# Patient Record
Sex: FEMALE | Race: BLACK OR AFRICAN AMERICAN | NOT HISPANIC OR LATINO | URBAN - METROPOLITAN AREA
[De-identification: names, ages, dates, MRNs, and addresses within clinical notes are randomized per-mention and may not be internally consistent; named-entity substitution may affect disease eponyms.]

---

## 2018-06-01 ENCOUNTER — EMERGENCY (EMERGENCY)
Facility: HOSPITAL | Age: 43
LOS: 1 days | Discharge: HOME | End: 2018-06-01
Admitting: EMERGENCY MEDICINE
Payer: MEDICARE

## 2018-06-01 VITALS
RESPIRATION RATE: 16 BRPM | TEMPERATURE: 98 F | DIASTOLIC BLOOD PRESSURE: 72 MMHG | OXYGEN SATURATION: 99 % | SYSTOLIC BLOOD PRESSURE: 120 MMHG | HEART RATE: 77 BPM

## 2018-06-01 VITALS
HEART RATE: 84 BPM | SYSTOLIC BLOOD PRESSURE: 117 MMHG | DIASTOLIC BLOOD PRESSURE: 81 MMHG | RESPIRATION RATE: 18 BRPM | TEMPERATURE: 98 F | OXYGEN SATURATION: 98 %

## 2018-06-01 DIAGNOSIS — M62.81 MUSCLE WEAKNESS (GENERALIZED): ICD-10-CM

## 2018-06-01 DIAGNOSIS — M21.371 FOOT DROP, RIGHT FOOT: ICD-10-CM

## 2018-06-01 DIAGNOSIS — M79.661 PAIN IN RIGHT LOWER LEG: ICD-10-CM

## 2018-06-01 DIAGNOSIS — R20.2 PARESTHESIA OF SKIN: ICD-10-CM

## 2018-06-01 PROCEDURE — 99283 EMERGENCY DEPT VISIT LOW MDM: CPT

## 2018-06-01 NOTE — ED ADULT NURSE NOTE - OBJECTIVE STATEMENT
Pt BIBA c/o bilateral lower extremities numbness and tingling, sudden onset today while waiting for access-a-ride that never came. Pt has hx of MS.

## 2018-06-01 NOTE — ED PROVIDER NOTE - OBJECTIVE STATEMENT
h/o MS, nystagmus, chronic bilateral lower leg weakness, walks with cane, R drop foot, BIB EMS for bilateral leg weakness. States she was standing waiting for [facility]ide, got a text message that she miss her ride, became anxious and began to feel tingling in both feet. She sat herself down on the sidewalk and called 911. States she usually cannot stand for 10 minutes and was attempting to hail a cab but no cabs came for her.

## 2018-06-01 NOTE — ED PROVIDER NOTE - MEDICAL DECISION MAKING DETAILS
Patient w/ MS presents w/ bilateral weakness, which has now resolved .Symptoms likely precipitative from prolong standing. Patient currently waiting for arrangement transport home.

## 2018-06-01 NOTE — ED ADULT TRIAGE NOTE - CHIEF COMPLAINT QUOTE
pt standing for two long at the [facility] for access-a ride developed b/l leg tingling. has hx of MS

## 2018-06-01 NOTE — ED PROVIDER NOTE - NS_EDPROVIDERDISPOUSERTYPE_ED_A_ED
I have personally evaluated and examined the patient. The Attending was available to me as a supervising provider if needed. Scribe Attestation (For Scribes USE Only).../I have personally evaluated and examined the patient. The Attending was available to me as a supervising provider if needed.

## 2018-06-01 NOTE — ED ADULT NURSE NOTE - CHIEF COMPLAINT QUOTE
pt standing for two long at the [employer] for access-a ride developed b/l leg tingling. has hx of MS

## 2022-08-06 NOTE — ED PROVIDER NOTE - TOBACCO USE
ED Provider Note    CHIEF COMPLAINT  Chief Complaint   Patient presents with   • Flank Pain     R side 10/10 sharp onset 30 minutes ago. Pt states she has been seen before and told she has ovarian cysts and kidney stones and believes that a stone may be passing or the pain is due to a cyst.       HPI  Bárbara Humphrey is a 28 y.o. female who presents for evaluation of crampy bilateral right greater than left lower abdominal pain.  The patient does have a history of endometriosis and was recently seen here around 2 weeks ago and diagnosed with bilateral ovarian cyst without torsion.  The patient reports she was feeling better but the pain came back this morning.  She does not endorse any hematuria dysuria or urinary frequency or hesitancy.  She denies flank pain.  She does report colicky 10 out of 10 right lower abdominal pain.  It does not radiate to the back no report of any high fevers or chills    REVIEW OF SYSTEMS  See HPI for further details.  No high fevers chills night sweats or weight loss all other systems are negative.     PAST MEDICAL HISTORY  Past Medical History:   Diagnosis Date   • Endometriosis        FAMILY HISTORY  Noncontributory    SOCIAL HISTORY  Social History     Socioeconomic History   • Marital status:    Tobacco Use   • Smoking status: Never Smoker   • Smokeless tobacco: Never Used   Substance and Sexual Activity   • Alcohol use: Yes     Comment: Socially   • Drug use: Never       SURGICAL HISTORY  Past Surgical History:   Procedure Laterality Date   • PELVISCOPY  04/28/2021    Pelviscopy, Lysis of Adhesion, Excision right Ovarian cyst. Dr. Ramos   • NM LAP,DIAGNOSTIC ABDOMEN  4/28/2021    Procedure: PELVISCOPY, LYSIS OF ADHESIONS;  Surgeon: Hannah Van M.D.;  Location: SURGERY [facility];  Service: Obstetrics   • OVARIAN CYSTECTOMY Right 4/28/2021    Procedure: EXCISION, CYST, OVARY;  Surgeon: Hannah Van M.D.;  Location: SURGERY [facility];  Service:  Obstetrics       CURRENT MEDICATIONS  Home Medications     Reviewed by Ora Cui R.N. (Registered Nurse) on 08/06/22 at 1348  Med List Status: Not Addressed   Medication Last Dose Status   APAP-Pamabrom-Pyrilamine (PAMPRIN MAX PAIN FORMULA PO)  Active   celecoxib (CELEBREX) 200 MG Cap  Active   cyclobenzaprine (FLEXERIL) 10 mg Tab  Active   docusate sodium (COLACE) 100 MG Cap  Active   ibuprofen (MOTRIN) 200 MG Tab  Active                ALLERGIES  No Known Allergies    PHYSICAL EXAM  VITAL SIGNS: /85   Pulse 84   Temp 37.6 °C (99.6 °F) (Temporal)   Resp 16   Ht 1.524 m (5')   Wt 86.1 kg (189 lb 13.1 oz)   LMP 07/30/2022 (Exact Date)   SpO2 99%   BMI 37.07 kg/m²       Constitutional: Well developed, Well nourished, No acute distress, Non-toxic appearance.   HENT: Normocephalic, Atraumatic, Bilateral external ears normal, Oropharynx moist, No oral exudates, Nose normal.   Eyes: PERRLA, EOMI, Conjunctiva normal, No discharge.   Neck: Normal range of motion, No tenderness, Supple, No stridor.   Cardiovascular: Normal heart rate, Normal rhythm, No murmurs, No rubs, No gallops.   Thorax & Lungs: Normal breath sounds, No respiratory distress, No wheezing, No chest tenderness.   Abdomen: Bowel sounds normal, Soft, No tenderness, No masses, No pulsatile masses.   Skin: Warm, Dry, No erythema, No rash.   Back: No tenderness, No CVA tenderness.   Genitalia: Patient deferred t distal pulses, No edema, No tenderness, No cyanosis, No clubbing.   Musculoskeletal: Good range of motion in all major joints. No tenderness to palpation or major deformities noted.   Neurologic: Alert & oriented x 3, Normal motor function, Normal sensory function, No focal deficits noted.   Psychiatric: Anxious    Results for orders placed or performed during the hospital encounter of 08/06/22   CBC WITH DIFFERENTIAL   Result Value Ref Range    WBC 11.5 (H) 4.8 - 10.8 K/uL    RBC 4.04 (L) 4.20 - 5.40 M/uL    Hemoglobin 12.0 12.0  - 16.0 g/dL    Hematocrit 36.8 (L) 37.0 - 47.0 %    MCV 91.1 81.4 - 97.8 fL    MCH 29.7 27.0 - 33.0 pg    MCHC 32.6 (L) 33.6 - 35.0 g/dL    RDW 44.9 35.9 - 50.0 fL    Platelet Count 367 164 - 446 K/uL    MPV 11.1 9.0 - 12.9 fL    Neutrophils-Polys 67.00 44.00 - 72.00 %    Lymphocytes 25.00 22.00 - 41.00 %    Monocytes 5.30 0.00 - 13.40 %    Eosinophils 1.90 0.00 - 6.90 %    Basophils 0.40 0.00 - 1.80 %    Immature Granulocytes 0.40 0.00 - 0.90 %    Nucleated RBC 0.00 /100 WBC    Neutrophils (Absolute) 7.68 (H) 2.00 - 7.15 K/uL    Lymphs (Absolute) 2.87 1.00 - 4.80 K/uL    Monos (Absolute) 0.61 0.00 - 0.85 K/uL    Eos (Absolute) 0.22 0.00 - 0.51 K/uL    Baso (Absolute) 0.05 0.00 - 0.12 K/uL    Immature Granulocytes (abs) 0.05 0.00 - 0.11 K/uL    NRBC (Absolute) 0.00 K/uL   COMP METABOLIC PANEL   Result Value Ref Range    Sodium 139 135 - 145 mmol/L    Potassium 4.2 3.6 - 5.5 mmol/L    Chloride 105 96 - 112 mmol/L    Co2 21 20 - 33 mmol/L    Anion Gap 13.0 7.0 - 16.0    Glucose 97 65 - 99 mg/dL    Bun 11 8 - 22 mg/dL    Creatinine 0.78 0.50 - 1.40 mg/dL    Calcium 9.4 8.5 - 10.5 mg/dL    AST(SGOT) 15 12 - 45 U/L    ALT(SGPT) 24 2 - 50 U/L    Alkaline Phosphatase 85 30 - 99 U/L    Total Bilirubin 0.4 0.1 - 1.5 mg/dL    Albumin 4.6 3.2 - 4.9 g/dL    Total Protein 7.9 6.0 - 8.2 g/dL    Globulin 3.3 1.9 - 3.5 g/dL    A-G Ratio 1.4 g/dL   LIPASE   Result Value Ref Range    Lipase 18 11 - 82 U/L   HCG QUAL SERUM   Result Value Ref Range    Beta-Hcg Qualitative Serum Negative Negative   URINALYSIS,CULTURE IF INDICATED    Specimen: Urine   Result Value Ref Range    Color Orange (A)     Character Cloudy (A)     Specific Gravity >=1.030 <1.035    Ph 6.0 5.0 - 8.0    Glucose Negative Negative mg/dL    Ketones Trace (A) Negative mg/dL    Protein 30 (A) Negative mg/dL    Bilirubin Small (A) Negative    Urobilinogen, Urine 0.2 Negative    Nitrite Negative Negative    Leukocyte Esterase Trace (A) Negative    Occult Blood Large (A)  Negative    Micro Urine Req Microscopic    ESTIMATED GFR   Result Value Ref Range    GFR (CKD-EPI) 106 >60 mL/min/1.73 m 2   URINE MICROSCOPIC (W/UA)   Result Value Ref Range    WBC 2-5 /hpf    RBC 10-20 (A) /hpf    Bacteria Negative None /hpf    Epithelial Cells Few /hpf         CT-RENAL COLIC EVALUATION(A/P W/O)   Final Result      1.  4 x 4 x 8 mm stone in the RIGHT mid ureter with mild-to-moderate obstructive changes.   2.  Anterior pelvic mass again noted, similar in overall size to prior exam.   3.  Appendicitis is felt unlikely, however appendix is only partially visualized.            COURSE & MEDICAL DECISION MAKING  Pertinent Labs & Imaging studies reviewed. (See chart for details)  An IV was established.  Differential diagnosis was extensive including appendicitis kidney stone pyelonephritis ovarian cyst ovarian torsion intra-abdominal mass.  Reviewed the patient's recent visit history including her CT scan and ultrasound.  She does have known ovarian cyst as well as endometriosis.  2 weeks ago she did have an intrarenal kidney stone bilaterally and here she had hematuria with mild leukocytosis and right mid ureter partially obstructing stone that is most consistent with her symptomatology.  I doubt that she has appendicitis or concomitant ovarian torsion given her colicky right mid.cvgas with radiation both anteriorly and posteriorly to the back and flank.  She has no high fever or bandemia and urine primarily demonstrates hematuria rather than pyuria.  We will discharge her home with some Zofran Flomax, Bactrim and Norco.  She is already been referred to a urologist and has an appointment next week      FINAL IMPRESSION  1.  Renal colic secondary to a 4 x 4 mm right mid ureteral stone           Electronically signed by: Milton Townsend M.D., 8/6/2022 3:17 PM     Never smoker